# Patient Record
Sex: MALE | Race: BLACK OR AFRICAN AMERICAN | NOT HISPANIC OR LATINO
[De-identification: names, ages, dates, MRNs, and addresses within clinical notes are randomized per-mention and may not be internally consistent; named-entity substitution may affect disease eponyms.]

---

## 2017-01-11 ENCOUNTER — RECORDS - HEALTHEAST (OUTPATIENT)
Dept: ADMINISTRATIVE | Facility: OTHER | Age: 52
End: 2017-01-11

## 2017-02-09 ENCOUNTER — RECORDS - HEALTHEAST (OUTPATIENT)
Dept: ADMINISTRATIVE | Facility: OTHER | Age: 52
End: 2017-02-09

## 2017-03-08 ENCOUNTER — RECORDS - HEALTHEAST (OUTPATIENT)
Dept: ADMINISTRATIVE | Facility: OTHER | Age: 52
End: 2017-03-08

## 2017-05-02 ENCOUNTER — RECORDS - HEALTHEAST (OUTPATIENT)
Dept: ADMINISTRATIVE | Facility: OTHER | Age: 52
End: 2017-05-02

## 2017-05-23 ENCOUNTER — RECORDS - HEALTHEAST (OUTPATIENT)
Dept: ADMINISTRATIVE | Facility: OTHER | Age: 52
End: 2017-05-23

## 2017-06-05 ENCOUNTER — RECORDS - HEALTHEAST (OUTPATIENT)
Dept: ADMINISTRATIVE | Facility: OTHER | Age: 52
End: 2017-06-05

## 2017-06-13 ENCOUNTER — RECORDS - HEALTHEAST (OUTPATIENT)
Dept: ADMINISTRATIVE | Facility: OTHER | Age: 52
End: 2017-06-13

## 2017-07-13 ENCOUNTER — OFFICE VISIT - HEALTHEAST (OUTPATIENT)
Dept: FAMILY MEDICINE | Facility: CLINIC | Age: 52
End: 2017-07-13

## 2017-07-13 DIAGNOSIS — I10 ESSENTIAL HYPERTENSION WITH GOAL BLOOD PRESSURE LESS THAN 140/90: ICD-10-CM

## 2017-07-13 DIAGNOSIS — M54.9 BACKACHE: ICD-10-CM

## 2017-07-13 DIAGNOSIS — G56.22 ULNAR NEUROPATHY OF LEFT UPPER EXTREMITY: ICD-10-CM

## 2017-07-13 RX ORDER — HYDROCODONE BITARTRATE AND ACETAMINOPHEN 10; 325 MG/1; MG/1
TABLET ORAL
Refills: 0 | Status: SHIPPED | COMMUNITY
Start: 2017-05-02

## 2017-07-13 RX ORDER — METHOCARBAMOL 750 MG/1
750 TABLET, FILM COATED ORAL EVERY 6 HOURS PRN
Status: SHIPPED | COMMUNITY
Start: 2017-07-13

## 2017-07-13 RX ORDER — NAPROXEN 500 MG/1
500 TABLET ORAL
Refills: 2 | Status: SHIPPED | COMMUNITY
Start: 2017-05-24

## 2017-07-13 RX ORDER — HYDROCHLOROTHIAZIDE 12.5 MG/1
12.5 TABLET ORAL DAILY
Qty: 90 TABLET | Refills: 0 | Status: SHIPPED | OUTPATIENT
Start: 2017-07-13

## 2017-07-13 RX ORDER — LATANOPROST 50 UG/ML
1 SOLUTION/ DROPS OPHTHALMIC AT BEDTIME
Status: SHIPPED | COMMUNITY
Start: 2017-07-13

## 2017-07-13 RX ORDER — LISINOPRIL 20 MG/1
20 TABLET ORAL DAILY
Refills: 2 | Status: SHIPPED | COMMUNITY
Start: 2017-06-11

## 2017-07-13 RX ORDER — DORZOLAMIDE HYDROCHLORIDE AND TIMOLOL MALEATE 20; 5 MG/ML; MG/ML
SOLUTION/ DROPS OPHTHALMIC
Refills: 11 | Status: SHIPPED | COMMUNITY
Start: 2017-05-17

## 2017-07-13 RX ORDER — IBUPROFEN 800 MG/1
800 TABLET, FILM COATED ORAL EVERY 8 HOURS PRN
Status: SHIPPED | COMMUNITY
Start: 2017-07-13

## 2017-07-13 ASSESSMENT — MIFFLIN-ST. JEOR: SCORE: 1636.96

## 2017-09-16 ENCOUNTER — COMMUNICATION - HEALTHEAST (OUTPATIENT)
Dept: FAMILY MEDICINE | Facility: CLINIC | Age: 52
End: 2017-09-16

## 2017-11-08 ENCOUNTER — OFFICE VISIT - HEALTHEAST (OUTPATIENT)
Dept: FAMILY MEDICINE | Facility: CLINIC | Age: 52
End: 2017-11-08

## 2017-11-08 DIAGNOSIS — E78.00 PURE HYPERCHOLESTEROLEMIA: ICD-10-CM

## 2017-11-08 DIAGNOSIS — I10 ESSENTIAL HYPERTENSION: ICD-10-CM

## 2017-11-08 DIAGNOSIS — E66.9 OBESITY: ICD-10-CM

## 2017-11-08 DIAGNOSIS — T78.3XXA ANGIOEDEMA: ICD-10-CM

## 2017-11-08 ASSESSMENT — MIFFLIN-ST. JEOR: SCORE: 1605.77

## 2017-12-04 ENCOUNTER — RECORDS - HEALTHEAST (OUTPATIENT)
Dept: ADMINISTRATIVE | Facility: OTHER | Age: 52
End: 2017-12-04

## 2017-12-11 ENCOUNTER — RECORDS - HEALTHEAST (OUTPATIENT)
Dept: ADMINISTRATIVE | Facility: OTHER | Age: 52
End: 2017-12-11

## 2017-12-19 ENCOUNTER — RECORDS - HEALTHEAST (OUTPATIENT)
Dept: ADMINISTRATIVE | Facility: OTHER | Age: 52
End: 2017-12-19

## 2017-12-20 ENCOUNTER — RECORDS - HEALTHEAST (OUTPATIENT)
Dept: ADMINISTRATIVE | Facility: OTHER | Age: 52
End: 2017-12-20

## 2017-12-27 ENCOUNTER — RECORDS - HEALTHEAST (OUTPATIENT)
Dept: ADMINISTRATIVE | Facility: OTHER | Age: 52
End: 2017-12-27

## 2018-01-29 ENCOUNTER — RECORDS - HEALTHEAST (OUTPATIENT)
Dept: ADMINISTRATIVE | Facility: OTHER | Age: 53
End: 2018-01-29

## 2018-02-12 ENCOUNTER — RECORDS - HEALTHEAST (OUTPATIENT)
Dept: ADMINISTRATIVE | Facility: OTHER | Age: 53
End: 2018-02-12

## 2018-03-05 ENCOUNTER — RECORDS - HEALTHEAST (OUTPATIENT)
Dept: ADMINISTRATIVE | Facility: OTHER | Age: 53
End: 2018-03-05

## 2018-04-16 ENCOUNTER — RECORDS - HEALTHEAST (OUTPATIENT)
Dept: ADMINISTRATIVE | Facility: OTHER | Age: 53
End: 2018-04-16

## 2018-05-14 ENCOUNTER — RECORDS - HEALTHEAST (OUTPATIENT)
Dept: ADMINISTRATIVE | Facility: OTHER | Age: 53
End: 2018-05-14

## 2018-05-25 ENCOUNTER — COMMUNICATION - HEALTHEAST (OUTPATIENT)
Dept: FAMILY MEDICINE | Facility: CLINIC | Age: 53
End: 2018-05-25

## 2018-06-25 ENCOUNTER — RECORDS - HEALTHEAST (OUTPATIENT)
Dept: ADMINISTRATIVE | Facility: OTHER | Age: 53
End: 2018-06-25

## 2018-07-09 ENCOUNTER — RECORDS - HEALTHEAST (OUTPATIENT)
Dept: ADMINISTRATIVE | Facility: OTHER | Age: 53
End: 2018-07-09

## 2018-07-18 ENCOUNTER — COMMUNICATION - HEALTHEAST (OUTPATIENT)
Dept: FAMILY MEDICINE | Facility: CLINIC | Age: 53
End: 2018-07-18

## 2018-07-23 ENCOUNTER — RECORDS - HEALTHEAST (OUTPATIENT)
Dept: ADMINISTRATIVE | Facility: OTHER | Age: 53
End: 2018-07-23

## 2018-12-17 ENCOUNTER — RECORDS - HEALTHEAST (OUTPATIENT)
Dept: ADMINISTRATIVE | Facility: OTHER | Age: 53
End: 2018-12-17

## 2019-05-30 ENCOUNTER — COMMUNICATION - HEALTHEAST (OUTPATIENT)
Dept: FAMILY MEDICINE | Facility: CLINIC | Age: 54
End: 2019-05-30

## 2019-05-30 DIAGNOSIS — I10 ESSENTIAL HYPERTENSION: ICD-10-CM

## 2020-01-18 ENCOUNTER — COMMUNICATION - HEALTHEAST (OUTPATIENT)
Dept: FAMILY MEDICINE | Facility: CLINIC | Age: 55
End: 2020-01-18

## 2020-01-18 DIAGNOSIS — I10 ESSENTIAL HYPERTENSION: ICD-10-CM

## 2020-01-20 RX ORDER — METOPROLOL TARTRATE 50 MG
TABLET ORAL
Qty: 120 TABLET | Refills: 0 | Status: SHIPPED | OUTPATIENT
Start: 2020-01-20

## 2021-05-29 NOTE — TELEPHONE ENCOUNTER
RN cannot approve Refill Request    RN can NOT refill this medication overdue for office visits and/or labs.    Stefan Dale, [facility] Triage/Med Refill 5/30/2019    Requested Prescriptions   Pending Prescriptions Disp Refills     metoprolol tartrate (LOPRESSOR) 50 MG tablet [Pharmacy Med Name: METOPROLOL TARTRATE 50MG TABLETS] 180 tablet 0     Sig: TAKE 1 TABLET(50 MG) BY MOUTH TWICE DAILY       Beta-Blockers Refill Protocol Failed - 5/30/2019  3:11 AM        Failed - PCP or prescribing provider visit in past 12 months or next 3 months     Last office visit with prescriber/PCP: 11/8/2017 Pratik Stringer MD OR same dept: Visit date not found OR same specialty: 11/8/2017 Pratik Stringer MD  Last physical: Visit date not found Last MTM visit: Visit date not found   Next visit within 3 mo: Visit date not found  Next physical within 3 mo: Visit date not found  Prescriber OR PCP: Pratik Stringer MD  Last diagnosis associated with med order: There are no diagnoses linked to this encounter.  If protocol passes may refill for 12 months if within 3 months of last provider visit (or a total of 15 months).             Failed - Blood pressure filed in past 12 months     BP Readings from Last 1 Encounters:   11/08/17 152/90

## 2021-05-31 VITALS — HEIGHT: 65 IN | BODY MASS INDEX: 32.65 KG/M2 | WEIGHT: 196 LBS

## 2021-05-31 VITALS — HEIGHT: 65 IN | WEIGHT: 190 LBS | BODY MASS INDEX: 31.65 KG/M2

## 2021-06-05 NOTE — TELEPHONE ENCOUNTER
RN cannot approve Refill Request    RN can NOT refill this medication PCP messaged that patient is overdue for Labs and Office Visit, and BP check.Last office visit: 11/8/2017 Pratik Stringer MD Last Physical: Visit date not found Last MTM visit: Visit date not found Last visit same specialty: 11/8/2017 Pratik Stringer MD.  Next visit within 3 mo: Visit date not found  Next physical within 3 mo: Visit date not found      Ariel Long, [facility] Triage/Med Refill 1/19/2020    Requested Prescriptions   Pending Prescriptions Disp Refills     metoprolol tartrate (LOPRESSOR) 50 MG tablet [Pharmacy Med Name: METOPROLOL TARTRATE 50MG TABLETS] 180 tablet 0     Sig: TAKE 1 TABLET BY MOUTH TWICE DAILY       Beta-Blockers Refill Protocol Failed - 1/18/2020  2:09 PM        Failed - PCP or prescribing provider visit in past 12 months or next 3 months     Last office visit with prescriber/PCP: 11/8/2017 Pratik Stringer MD OR same dept: Visit date not found OR same specialty: 11/8/2017 Pratik Stringer MD  Last physical: Visit date not found Last MTM visit: Visit date not found   Next visit within 3 mo: Visit date not found  Next physical within 3 mo: Visit date not found  Prescriber OR PCP: Pratik Stringer MD  Last diagnosis associated with med order: 1. Essential hypertension  - metoprolol tartrate (LOPRESSOR) 50 MG tablet [Pharmacy Med Name: METOPROLOL TARTRATE 50MG TABLETS]; TAKE 1 TABLET BY MOUTH TWICE DAILY  Dispense: 180 tablet; Refill: 0    If protocol passes may refill for 12 months if within 3 months of last provider visit (or a total of 15 months).             Failed - Blood pressure filed in past 12 months     BP Readings from Last 1 Encounters:   11/08/17 152/90

## 2021-06-11 NOTE — PROGRESS NOTES
Hypertension denies chest pain or headache.  Back surgery [facility] late 2016  Active in two jobs seven days per week.  Pains comes and goes depends on activiity.  Using methocarbamol few days     On lis20   Two daily for a month.    This an increase from 20/d  Had labs couple months ago.  methocarb prn  Doesn't like nsaid due to upset stomach.    No longer smoking.    Left arm tingle from elbow to 4-5 fingers.  intermittent .  No weakness or rash.   Few months.  Nonexertional    Obesity says labs show no diabetes mellitus     ROS: as noted above    OBJECTIVE:   Vitals:    07/13/17 0903   BP: 150/84   Pulse:    Resp:    Temp:       Eyes: non icteric, noninflamed  Lungs: no resp distress  Heart: regular  Ankles: no edema  Muscles: nontender  Mental status: euthymic  Neuro: nonfocal  ASSESSMENT/PLAN:  Hypertension uncontrolled/ add hctz to 40 lis and follow up two weeks.  Need labs then  Ulnar neuropathy/ avoid pressure  follow up two wks  [facility]  Uses intermittent analgesic every two months.  Active in work/ prn metho carb.   Discussed acetaminophen and nsaid options  Obesity 32/ Life style modification   follow up two wks  Get records.  Apparently labs done recently  Had colonoscopy couple years ago  1. Essential hypertension with goal blood pressure less than 140/90  hydroCHLOROthiazide (HYDRODIURIL) 12.5 MG tablet   2. Ulnar neuropathy of left upper extremity     3. Backache

## 2021-06-14 NOTE — PROGRESS NOTES
On acei over a year.  Angioedema.  ED.  Stopped.  Asked to increase toprol to 50 bid and reluctant and did not take  Still on hctz and did not follow up for labs.  Says is seen elsewhere.    Hx ldl 170s.   Seen elsewhere and told ok.    Labs in record 224 total and ldl 140s    Pt very reluctant to take more meds    Concerned that he was given lis when blacks were known to get angioedema    Hypertension denies chest pain or headache.  Obesity denies polyuria  ROS: as noted above    OBJECTIVE:   Vitals:    11/08/17 0902   BP: 152/90   Pulse:    Resp:       Eyes: non icteric, noninflamed  Lungs: no resp distress  Heart: regular  Ankles: no edema  Muscles: nontender  Mental status: euthymic  Neuro: nonfocal    bp repeated twice   152/98    154/    ASSESSMENT/PLAN:    Additional diagnoses and related orders:  1. Essential hypertension     2. Pure hypercholesterolemia     3. Obesity     4. Angioedema       Discussion of coronary artery disease risk management vs potential side effects    Convinced to engage toprol 100 with hctz and follow up visit and labs in two wks  Will need to check lipid then too.

## 2022-08-11 ENCOUNTER — OFFICE (OUTPATIENT)
Dept: URBAN - METROPOLITAN AREA CLINIC 72 | Facility: CLINIC | Age: 57
End: 2022-08-11

## 2022-08-11 VITALS
DIASTOLIC BLOOD PRESSURE: 100 MMHG | HEIGHT: 66 IN | WEIGHT: 212 LBS | HEART RATE: 80 BPM | OXYGEN SATURATION: 97 % | SYSTOLIC BLOOD PRESSURE: 168 MMHG

## 2022-08-11 DIAGNOSIS — Z87.19 PERSONAL HISTORY OF OTHER DISEASES OF THE DIGESTIVE SYSTEM: ICD-10-CM

## 2022-08-11 PROCEDURE — 99204 OFFICE O/P NEW MOD 45 MIN: CPT | Performed by: NURSE PRACTITIONER

## 2022-09-20 ENCOUNTER — ON CAMPUS - OUTPATIENT (OUTPATIENT)
Dept: URBAN - METROPOLITAN AREA MEDICAL CENTER 2 | Facility: MEDICAL CENTER | Age: 57
End: 2022-09-20
Payer: COMMERCIAL

## 2022-09-20 DIAGNOSIS — K63.5 POLYP OF COLON: ICD-10-CM

## 2022-09-20 DIAGNOSIS — K57.30 DIVERTICULOSIS OF LARGE INTESTINE WITHOUT PERFORATION OR ABS: ICD-10-CM

## 2022-09-20 DIAGNOSIS — K64.9 UNSPECIFIED HEMORRHOIDS: ICD-10-CM

## 2022-09-20 PROCEDURE — 45380 COLONOSCOPY AND BIOPSY: CPT | Performed by: INTERNAL MEDICINE

## 2023-07-26 ENCOUNTER — OFFICE (OUTPATIENT)
Dept: URBAN - METROPOLITAN AREA CLINIC 84 | Facility: CLINIC | Age: 58
End: 2023-07-26

## 2023-07-26 VITALS
HEIGHT: 66 IN | WEIGHT: 211.2 LBS | DIASTOLIC BLOOD PRESSURE: 78 MMHG | OXYGEN SATURATION: 99 % | HEART RATE: 85 BPM | SYSTOLIC BLOOD PRESSURE: 134 MMHG

## 2023-07-26 DIAGNOSIS — Z86.19 PERSONAL HISTORY OF OTHER INFECTIOUS AND PARASITIC DISEASES: ICD-10-CM

## 2023-07-26 DIAGNOSIS — Z87.19 PERSONAL HISTORY OF OTHER DISEASES OF THE DIGESTIVE SYSTEM: ICD-10-CM

## 2023-07-26 DIAGNOSIS — R19.7 DIARRHEA, UNSPECIFIED: ICD-10-CM

## 2023-07-26 DIAGNOSIS — Z86.010 PERSONAL HISTORY OF COLONIC POLYPS: ICD-10-CM

## 2023-07-26 DIAGNOSIS — K75.0 ABSCESS OF LIVER: ICD-10-CM

## 2023-07-26 DIAGNOSIS — D64.9 ANEMIA, UNSPECIFIED: ICD-10-CM

## 2023-07-26 DIAGNOSIS — R10.10 UPPER ABDOMINAL PAIN, UNSPECIFIED: ICD-10-CM

## 2023-07-26 DIAGNOSIS — R14.0 ABDOMINAL DISTENSION (GASEOUS): ICD-10-CM

## 2023-07-26 PROCEDURE — 99214 OFFICE O/P EST MOD 30 MIN: CPT | Performed by: NURSE PRACTITIONER

## 2023-07-26 RX ORDER — OMEPRAZOLE 40 MG/1
CAPSULE, DELAYED RELEASE ORAL
Qty: 90 | Refills: 1 | Status: COMPLETED
End: 2023-12-15

## 2023-09-21 ENCOUNTER — OFFICE (OUTPATIENT)
Dept: URBAN - METROPOLITAN AREA CLINIC 84 | Facility: CLINIC | Age: 58
End: 2023-09-21

## 2023-09-21 VITALS
WEIGHT: 211 LBS | DIASTOLIC BLOOD PRESSURE: 86 MMHG | OXYGEN SATURATION: 97 % | HEART RATE: 70 BPM | HEIGHT: 66 IN | SYSTOLIC BLOOD PRESSURE: 146 MMHG

## 2023-09-21 DIAGNOSIS — D50.9 IRON DEFICIENCY ANEMIA, UNSPECIFIED: ICD-10-CM

## 2023-09-21 DIAGNOSIS — R14.0 ABDOMINAL DISTENSION (GASEOUS): ICD-10-CM

## 2023-09-21 DIAGNOSIS — Z87.19 PERSONAL HISTORY OF OTHER DISEASES OF THE DIGESTIVE SYSTEM: ICD-10-CM

## 2023-09-21 DIAGNOSIS — K75.0 ABSCESS OF LIVER: ICD-10-CM

## 2023-09-21 DIAGNOSIS — R19.7 DIARRHEA, UNSPECIFIED: ICD-10-CM

## 2023-09-21 DIAGNOSIS — R10.10 UPPER ABDOMINAL PAIN, UNSPECIFIED: ICD-10-CM

## 2023-09-21 DIAGNOSIS — K42.9 UMBILICAL HERNIA WITHOUT OBSTRUCTION OR GANGRENE: ICD-10-CM

## 2023-09-21 PROCEDURE — 99214 OFFICE O/P EST MOD 30 MIN: CPT | Performed by: NURSE PRACTITIONER

## 2023-09-21 NOTE — SERVICEHPINOTES
Chidi Seay   is seen today for a follow-up visit.    brInitial visit, 8/11/2022brPyudyasant 57-year-old referred for consultative diverticulitis with abscess. He was hospitalized at [facility] for 11 days on 6/20/2022. He was followed by Dr Pope. He did not require surgery. he was diagnosed with abscess. He was eventually discharged on Augmentin. the day prior to his follow-up with Dr. Pope, he developed worsening abdominal pain and went back to the ER. He was rehospitalized 7/13/2022–7/15/22 . Stool was positive for C. difficile. CT abdomen and pelvis with contrast revealed diffuse severe colitis appears progressed compared to prior study, previously identified small bowel distention and resolved. He was discharged on vancomycin 125 4 times a day. He completed antibiotics 1 week ago. He is better. He still having soft stool to runny stool daily. He feels some soreness in his abdomen and mild bloating. He is weak and still fatigued. He is currently still off work. He denies fever, nausea, vomiting, signs of GI bleeding.brPlanbr- stool C. difficilebr- CBCbr- ColonoscopyInterval history, 7/26/2023brStool was negative for C. difficile. Colonoscopy was completed 9/2022 with 1 small cecal tubular adenoma, diverticulosis, small internal hemorrhoids.Caleb developed fever and malaise diarrhea.Caleb was admitted to [facility] 5/6/20 23–5/22/2023. He was diagnosed with Escherichia coli bacteremia, sepsis, abscess of the liver. He was seen by ID. CT showed possible liver masses versus abscesses. Repeat CT showed no improvement in abscesses, IR placed strains which was subsequently removed. He was discharged home with IV Flagyl ×2 weeks and IV Rocephin ×4 weeks. Tumor markers were negative.he states he last saw JOAQUÍN Coburn on 7/14/2023. CT 7/10/2023 revealed improvement with liver abscesses, now 1.1 x 1.0 and 1.6 x 1.3 cm. He completed his oral antibiotics yesterday. He was scheduled to see Dr. Ding yesterday that appointment was canceledhe says his abdominal pain and diarrhea that he went to the ER for back in May has never completely resolved. He has diffuse upper abdominal pain that wakes him up about 4:30 every morning. It's achy. He rates it as severe and it seems to get better at around noon and then goes away around 2-3 pm. He wakes up bloated and feels bloated all day. The pain is there with and without eating. He has diarrhea 3-4 bowel movements in the morning ranges from liquid to loose. He denies fever, hematochezia, mucus in the stool, heartburn. He has occasional nausea.brHe had a cystoscopy 7/19/2023 with Dr. Copeland.
br Plan
br - obtain records from Dr. Diazabr- Ferritinbr- Iron / TIBC / Iron Sat%br- Folate / B-12 (serum)br- Tissue Transglutaminase IgA Ab (TTG)br- Total Serum IgAbr- CMP (Complete Metabolic Panel)br- CBC w/auto diffbr- Stool Calprotectin (fecal)br- GI Panel, STOOLbr- omeprazole
br
brInterval history, 9/21/2023
br stool negative for infection.  
br MRI was ordered and negative for biliary cyst.  Liver infection has resolved.br He continues to have daily bloating and swelling in his abdomen feels distended and tight.  his stomach aches all the time.  He has occasional nausea no vomiting.  If he eats anything his stomach hurts.  He is currently only eating 1 meal a day.  He is just trying to eat a few bites or snacks throughout the day.  His bowel movements wake him up around 4:30 5:30 in the morning.  The stool is very little and he has urgency but not much bowel movement.  The stool is slightly hard in the beginning and then will be all water.br Labs
br 
7/2023–stool GI panel–negative, FC 5br 7/2023- WBC 5, Hgb 12.9, plt 207, G 132, iron 43, 12%, B12 449, folate 3.7, TTG &lt 2, IgA 120, ferritin 193br5/19/2023–WBC 9, hemoglobin 10.9, platelet 444, CMP–normalbr5/9/2023–CA-125–normal, CA-19-9–normal AFP 1.3, CEA–normalbr5/6/2023- WBC 16, hemoglobin 13, platelet 149, sodium 132, potassium 3, total bili 2.8, 8 , AST 62, ALT 81br7/15/2022–WBC 8.9, hemoglobin 11.2, CMP normal except ALP 132br7/12/2022 positive C. difficile toxin B gene, WBC 10, hemoglobin 11.7, CMP normal except ALP 138Imaging
br8/9/2023 - Radiology - MRCP - Small subcapsular collection along posterior Right lobe measuring 16X 18mm. No evidence of residual infection in the liver, no biliary ductal dilation br7/10/2023–CT abdomen and pelvis with contrast continued improvement in hypodense lesions within the liver corresponding to sites of prior liver abscesses, colonic diverticulosis without diverticulitis, diffuse wall thickening of the urinary bladderbr6/13/2023–CT abdomen and pelvis with contrast decreased size of multiple hepatic abscesses, no discrete drainable fluid component remains, urinary bladder wall thickening, colonic diverticulosis without evidence of acute diverticulitisbr5/18/2023–CT abdomen and pelvis with contrast decreased fluid collection immediately surrounding the soft tissue drain in the posterior hepatic lobe, there is a secondary small fluid collection lying anterior to the drain which was seen on the prior study likely the same process, and this is decreased in sizebr5/12/2023–CT abdomen and pelvis with contrast the lesions of the liver identified, 2 of which have increased in size since prior exam, the parents compatible with history of hepatic abscess on the mild wall thickening of the sigmoid colon is diffuse may be due to chronic diverticular changebr5/6/2023–CT abdomen and pelvis contrasted–wall thickening of the proximal and mid sigmoid colon without pericolonic inflammatory fat stranding, 3 new low attenuation masses within the liver, suspicious for metastatic disease of the liver or liver abscesses, enlarged prostate, diffuse wall thickening of the urinary bladderbr7/12/2022–CT abdomen and pelvis contrasted–diffuse severe colitis, recently identified small bowel distention resolved, no pneumoperitoneum seenbr6/23/2022–CT abdomen and pelvis contrast and sigmoid diverticulitis without significant change, probable small bowel ileus, distal small bowel obstruction not excluded, increase small to moderate ascites,br6/20/2022–CT abdomen and pelvis with contrast sigmoid diverticulitis with interval development of a 2 x 2 by 2 x 2 cm intramural abscessbr6/18/2022–CT abdomen and pelvis with contrast moderate diverticulitis in the sigmoid colon and distal aspect of the descending colonbrproceduresbr5/8/2023- CT-guided liver biopsy consistent with hepatic abscesses with accompanying reactive and degenerative hepatocytes, no malignant cells identified, moderate echo vesicular fatty changebr9/2022–colonoscopy–Dr. Smith–small cecal polyp–tubular adenoma negative dysplasia, diverticulosis, small internal hemorrhoids visited="true"

## 2023-09-29 ENCOUNTER — ON CAMPUS - OUTPATIENT (OUTPATIENT)
Dept: URBAN - METROPOLITAN AREA MEDICAL CENTER 2 | Facility: MEDICAL CENTER | Age: 58
End: 2023-09-29
Payer: COMMERCIAL

## 2023-09-29 VITALS — HEIGHT: 66 IN

## 2023-09-29 DIAGNOSIS — K31.89 OTHER DISEASES OF STOMACH AND DUODENUM: ICD-10-CM

## 2023-09-29 DIAGNOSIS — K52.89 OTHER SPECIFIED NONINFECTIVE GASTROENTERITIS AND COLITIS: ICD-10-CM

## 2023-09-29 DIAGNOSIS — Z86.010 PERSONAL HISTORY OF COLONIC POLYPS: ICD-10-CM

## 2023-09-29 PROCEDURE — 43239 EGD BIOPSY SINGLE/MULTIPLE: CPT | Mod: 51 | Performed by: SPECIALIST

## 2023-09-29 PROCEDURE — 45380 COLONOSCOPY AND BIOPSY: CPT | Performed by: SPECIALIST

## 2023-09-29 PROCEDURE — 45381 COLONOSCOPY SUBMUCOUS NJX: CPT | Mod: 51 | Performed by: SPECIALIST

## 2023-09-29 NOTE — SERVICENOTES
EGD today > gastritis body, biopsied.  Normal duodenum, biopsied for sucrase def.
Colonoscopy > diverticulosis.
Edematous folds vs submucosal mass 45 cm desc, biopsied and tattooed.
F/u by phone. bx results.  F/u GI office 2-3 weeks.

## 2023-10-27 ENCOUNTER — OFFICE (OUTPATIENT)
Dept: URBAN - METROPOLITAN AREA CLINIC 84 | Facility: CLINIC | Age: 58
End: 2023-10-27

## 2023-10-27 VITALS
WEIGHT: 215 LBS | RESPIRATION RATE: 16 BRPM | DIASTOLIC BLOOD PRESSURE: 84 MMHG | HEIGHT: 66 IN | SYSTOLIC BLOOD PRESSURE: 136 MMHG | HEART RATE: 72 BPM

## 2023-10-27 DIAGNOSIS — R14.0 ABDOMINAL DISTENSION (GASEOUS): ICD-10-CM

## 2023-10-27 DIAGNOSIS — R68.81 EARLY SATIETY: ICD-10-CM

## 2023-10-27 DIAGNOSIS — R10.84 GENERALIZED ABDOMINAL PAIN: ICD-10-CM

## 2023-10-27 PROCEDURE — 99215 OFFICE O/P EST HI 40 MIN: CPT | Performed by: INTERNAL MEDICINE

## 2023-10-27 NOTE — SERVICEHPINOTES
Chidi Seay   is seen today for a follow-up visit.     
br58-year-old male [facility] initially seen in August 2022. Prior to this appointment he had had a episode of diverticulitis with abscess which did not require surgery, received antibiotics for this and then later developed a C. difficile infection. Follow-up colonoscopy after these events only notable for a low risk adenoma. In May 2023 he was hospitalized for liver abscess and E. coli sepsis, and outpatient follow-up after this found him to have a negative stool PCR and fecal calprotectin of 5, negative celiac disease panel.Caleb still had symptoms in September 2023 of abdominal pain and bloating and underwent EGD which was unremarkable and colonoscopy, which showed thickened folds with patchy erythema and a focal segment of the descending colon. Follow-up CT scan on this only notable for thickening of this area of the sigmoid colon without any other inflammatory signs. His disaccharidases returned low lactase from his duodenal biopsies.Caleb was prescribed 40 mg of omeprazole which did not help his symptoms.His primary complaint is constant abdominal distention and bloating that is very uncomfortable. He has been unable to have meaningful oral intake because of this discomfort. If he can eat, a sample diet he provided was yogurt for breakfast with creamer in his coffee, may be some fruit or chips to snack on through the day, and dinner example he gave was tacos with sour cream and cheese. He has also been taking a fiber supplement and probiotics.

## 2023-10-27 NOTE — SERVICENOTES
Stop fiber supplementation and stop probiotics. Try the Lactose Free Diet as instructed for 2 weeks, we will contact you at that time and then will discuss whether to proceed with the antibiotic Xifaxan and/or low FODMAP diet.

## 2023-12-15 ENCOUNTER — TELEHEALTH PROVIDED OTHER THAN IN PATIENT'S HOME (OUTPATIENT)
Dept: URBAN - METROPOLITAN AREA CLINIC 67 | Facility: CLINIC | Age: 58
End: 2023-12-15

## 2023-12-15 VITALS — HEIGHT: 66 IN

## 2023-12-15 DIAGNOSIS — R10.84 GENERALIZED ABDOMINAL PAIN: ICD-10-CM

## 2023-12-15 DIAGNOSIS — R14.0 ABDOMINAL DISTENSION (GASEOUS): ICD-10-CM

## 2023-12-15 PROCEDURE — 99215 OFFICE O/P EST HI 40 MIN: CPT | Mod: 95 | Performed by: INTERNAL MEDICINE

## 2023-12-15 NOTE — SERVICENOTES
Start supplementing fiber with Metamucil at 1-3 tsp / day with 8 oz of liquid and start Low Fodmap Diet

## 2023-12-15 NOTE — SERVICEHPINOTES
Still having the same symptoms emphasizing still waking up daily with abdominal discomfort and has worsening abdominal distention for the morning that maintains through the day.brBowel movements initially may have improved when taking MiraLAX, stopped this and usually BMs are at least 1 daily and are mostly liquid.brStopping probiotics and strict lactose-free diet did not improve symptoms.

## 2024-06-24 ENCOUNTER — OFFICE (OUTPATIENT)
Dept: URBAN - METROPOLITAN AREA CLINIC 67 | Facility: CLINIC | Age: 59
End: 2024-06-24
Payer: COMMERCIAL

## 2024-06-24 VITALS
HEART RATE: 65 BPM | HEIGHT: 66 IN | RESPIRATION RATE: 14 BRPM | WEIGHT: 218 LBS | DIASTOLIC BLOOD PRESSURE: 96 MMHG | SYSTOLIC BLOOD PRESSURE: 152 MMHG

## 2024-06-24 DIAGNOSIS — R14.0 ABDOMINAL DISTENSION (GASEOUS): ICD-10-CM

## 2024-06-24 DIAGNOSIS — K59.00 CONSTIPATION, UNSPECIFIED: ICD-10-CM

## 2024-06-24 PROCEDURE — 99214 OFFICE O/P EST MOD 30 MIN: CPT | Performed by: INTERNAL MEDICINE

## 2025-04-11 ENCOUNTER — OFFICE (OUTPATIENT)
Dept: URBAN - METROPOLITAN AREA CLINIC 67 | Facility: CLINIC | Age: 60
End: 2025-04-11
Payer: COMMERCIAL

## 2025-04-11 VITALS
HEIGHT: 66 IN | HEART RATE: 54 BPM | OXYGEN SATURATION: 96 % | SYSTOLIC BLOOD PRESSURE: 154 MMHG | WEIGHT: 218 LBS | DIASTOLIC BLOOD PRESSURE: 98 MMHG

## 2025-04-11 DIAGNOSIS — K75.0 ABSCESS OF LIVER: ICD-10-CM

## 2025-04-11 DIAGNOSIS — R14.0 ABDOMINAL DISTENSION (GASEOUS): ICD-10-CM

## 2025-04-11 PROCEDURE — 99215 OFFICE O/P EST HI 40 MIN: CPT | Performed by: INTERNAL MEDICINE
